# Patient Record
Sex: FEMALE | Race: WHITE | NOT HISPANIC OR LATINO | URBAN - METROPOLITAN AREA
[De-identification: names, ages, dates, MRNs, and addresses within clinical notes are randomized per-mention and may not be internally consistent; named-entity substitution may affect disease eponyms.]

---

## 2017-01-06 ENCOUNTER — IMPORTED ENCOUNTER (OUTPATIENT)
Dept: URBAN - METROPOLITAN AREA CLINIC 1 | Facility: CLINIC | Age: 62
End: 2017-01-06

## 2017-01-06 PROBLEM — H04.123: Noted: 2017-01-06

## 2017-01-06 PROBLEM — H16.143: Noted: 2017-01-06

## 2017-01-06 PROBLEM — H01.004: Noted: 2017-01-06

## 2017-01-06 PROBLEM — H01.001: Noted: 2017-01-06

## 2017-01-06 PROBLEM — H43.811: Noted: 2017-01-06

## 2017-01-06 PROBLEM — H25.813: Noted: 2017-01-06

## 2017-01-06 PROBLEM — H10.45: Noted: 2017-01-06

## 2017-01-06 PROBLEM — H40.013: Noted: 2017-01-06

## 2017-01-06 PROCEDURE — 99213 OFFICE O/P EST LOW 20 MIN: CPT

## 2017-01-06 NOTE — PATIENT DISCUSSION
1.  Cataract OU: Observe for now without intervention due to uncorrected vision 20/20 OU. PT does glare down to 20/50 OU but would like to wait until vision worsens. The patient was advised to contact us if any change or worsening of vision. 2. Dry Eyes OU w/ resolved PEK OU (H/o Large Parasol Plugs LLs OU) Continue ATs QID OU. 3.  Blepharitis posterior type OU - Daily warm compresses and lid scrubs were recommended. 4. Glaucoma Suspect OU (0.75/0.5) - Past w/u -. Fm hx. IOP stable on no meds. Will continue to monitor. 5. PVD w/o Tear OD - old/stable. 6. Allergic Conjunctivitis OU - Restart Zaditor BID OU and warm compresses BID OU. Return for an appointment for Return as scheduled with Dr. Kandi Bailon.

## 2017-12-18 ENCOUNTER — IMPORTED ENCOUNTER (OUTPATIENT)
Dept: URBAN - METROPOLITAN AREA CLINIC 1 | Facility: CLINIC | Age: 62
End: 2017-12-18

## 2017-12-18 PROBLEM — H04.123: Noted: 2017-12-18

## 2017-12-18 PROBLEM — H01.002: Noted: 2017-12-18

## 2017-12-18 PROBLEM — H40.023: Noted: 2017-12-18

## 2017-12-18 PROBLEM — H10.45: Noted: 2017-12-18

## 2017-12-18 PROBLEM — H25.813: Noted: 2017-12-18

## 2017-12-18 PROBLEM — H43.811: Noted: 2017-12-18

## 2017-12-18 PROBLEM — H16.143: Noted: 2017-12-18

## 2017-12-18 PROBLEM — H01.005: Noted: 2017-12-18

## 2017-12-18 PROCEDURE — 92014 COMPRE OPH EXAM EST PT 1/>: CPT

## 2017-12-18 PROCEDURE — 92133 CPTRZD OPH DX IMG PST SGM ON: CPT

## 2018-05-18 NOTE — PATIENT DISCUSSION
target brand solution.  change lenses monthly. pt had trouble with new trials at CPU distance (unsure if helped night glare). So try back to old Rx and see if is satisfactory.

## 2019-02-01 ENCOUNTER — IMPORTED ENCOUNTER (OUTPATIENT)
Dept: URBAN - METROPOLITAN AREA CLINIC 1 | Facility: CLINIC | Age: 64
End: 2019-02-01

## 2019-02-01 PROBLEM — H10.45: Noted: 2019-02-01

## 2019-02-01 PROBLEM — H01.001: Noted: 2019-02-01

## 2019-02-01 PROBLEM — H01.005: Noted: 2019-02-01

## 2019-02-01 PROBLEM — H01.004: Noted: 2019-02-01

## 2019-02-01 PROBLEM — H01.002: Noted: 2019-02-01

## 2019-02-01 PROBLEM — H04.123: Noted: 2019-02-01

## 2019-02-01 PROBLEM — H43.811: Noted: 2019-02-01

## 2019-02-01 PROBLEM — H40.023: Noted: 2019-02-01

## 2019-02-01 PROBLEM — H16.143: Noted: 2019-02-01

## 2019-02-01 PROCEDURE — 92133 CPTRZD OPH DX IMG PST SGM ON: CPT

## 2019-02-01 PROCEDURE — 92014 COMPRE OPH EXAM EST PT 1/>: CPT

## 2019-02-01 NOTE — PATIENT DISCUSSION
1.  Glaucoma Suspect OU (CD: 0.75 / 0.55) -- IOP stable today OU. Positive Family h/o Glaucoma. Asymmetric Cupping OD > OS. OCT today shows WNL OU. Patient is considered high risk. Condition was discussed with patient and patient understands. Will continue to monitor patient for any progression in condition. Patient was advised to call us with any problems questions or concerns. 2.  KENZIE w/ PEK OU (Plugs in Place LL's OU) -- Progressed. Recommend increase the frequent use of OTC PF AT's Q2H OU Routinely (sample given). 3.  Anterior Blepharitis OU - Daily Hot compresses and lid scrubs were recommended. 4. Allergic Conjunctivitis OU -- The condition was discussed with the patient. Avoidance of allergens and cool compresses were recommended. Continue Zaditor OU BID PRN Itching. 5.  PVD w/o Tear OD -- Old Stable. RD precautions given. Patient defers the refraction at today's visit. Return for an appointment in 6 MO for a 10 OU (Dry Eye / K Check OU) with Dr. Claudeen Cobble.

## 2019-03-25 ENCOUNTER — IMPORTED ENCOUNTER (OUTPATIENT)
Dept: URBAN - METROPOLITAN AREA CLINIC 1 | Facility: CLINIC | Age: 64
End: 2019-03-25

## 2019-03-25 PROBLEM — H04.123: Noted: 2019-03-25

## 2019-03-25 PROBLEM — H16.143: Noted: 2019-03-25

## 2019-03-25 PROCEDURE — 92012 INTRM OPH EXAM EST PATIENT: CPT

## 2019-03-25 NOTE — PATIENT DISCUSSION
1.  KENZIE w/ PEK OU- (LL plugs in place OU) Recommend PF ATs Q3H OU routinely 2. Glaucoma Suspect OU (CD 0.75/0.55) - IOP stable. Family hx. 3.  Anterior Blepharitis OU - Daily Hot compresses and lid scrubs were recommended. 4. Allergic Conjunctivitis OU - The condition was discussed with the patient. Avoidance of allergens and cool compresses were recommended. Continue Zaditor OU BID PRN Itching. 5.  Cataract OU - Observe 6. H/o PVD ODReturn for an appointment for Return as scheduled 10 with Dr. Ninoska Marie.

## 2020-04-28 ENCOUNTER — IMPORTED ENCOUNTER (OUTPATIENT)
Dept: URBAN - METROPOLITAN AREA CLINIC 1 | Facility: CLINIC | Age: 65
End: 2020-04-28

## 2020-04-28 PROBLEM — H43.811: Noted: 2020-04-28

## 2020-04-28 PROBLEM — H25.812: Noted: 2020-04-28

## 2020-04-28 PROBLEM — H16.143: Noted: 2020-04-28

## 2020-04-28 PROBLEM — H40.013: Noted: 2020-04-28

## 2020-04-28 PROBLEM — H01.004: Noted: 2020-04-28

## 2020-04-28 PROBLEM — H25.813: Noted: 2020-04-28

## 2020-04-28 PROBLEM — H01.001: Noted: 2020-04-28

## 2020-04-28 PROBLEM — H04.123: Noted: 2020-04-28

## 2020-04-28 PROCEDURE — 92014 COMPRE OPH EXAM EST PT 1/>: CPT

## 2020-04-28 NOTE — PATIENT DISCUSSION
1.  Cataract OU --  Visually Significant OU however will hold on Phaco/PCL until PEK improved. 2. KENZIE w/ PEK OU -- (LL plugs in place OU) Non-compliance with ATs. Recommend ATs TID OU routinely. Add Restasis w/o improvement. 3.  Anterior Blepharitis OU -- Daily Hot compresses and lid scrubs were recommended. 4. Glaucoma Suspect OU (CD 0.75/0.55) -- IOP stable. Family hx. Past w/u negative. Cont to observe. 5. Allergic Conjunctivitis OU -- The condition was discussed with the patient. Avoidance of allergens and cool compresses were recommended. Continue Zaditor OU BID PRN Itching. 6.  PVD w/o Tear OD -- RD precautions. Return for an appointment in 4 months for a 10/dfe/glare *Check K's* with Dr. Angela Easley.

## 2021-02-12 ENCOUNTER — IMPORTED ENCOUNTER (OUTPATIENT)
Dept: URBAN - METROPOLITAN AREA CLINIC 1 | Facility: CLINIC | Age: 66
End: 2021-02-12

## 2021-02-12 PROBLEM — H04.123: Noted: 2021-02-12

## 2021-02-12 PROBLEM — H01.001: Noted: 2021-02-12

## 2021-02-12 PROBLEM — H01.004: Noted: 2021-02-12

## 2021-02-12 PROBLEM — H25.813: Noted: 2021-02-12

## 2021-02-12 PROBLEM — H16.143: Noted: 2021-02-12

## 2021-02-12 PROCEDURE — 99214 OFFICE O/P EST MOD 30 MIN: CPT

## 2021-02-12 NOTE — PATIENT DISCUSSION
1.  KENZIE w/ PEK OU- (LL plugs in place OU) Improved OU. Recommend PF ATs Q3H OU routinely. 2.  Cataract OU: Observe for now without intervention. The patient was advised to contact us if any change or worsening of vision3. Anterior Blepharitis OU - Daily Hot compresses and lid scrubs were recommended. 4. Glaucoma Suspect OU (CD 0.75/0.55) - IOP stable. Family hx. Past w/u negative. Cont to observe. 5. Allergic Conjunctivitis OU - The condition was discussed with the patient. Avoidance of allergens and cool compresses were recommended. Continue Zaditor OU BID PRN Itching. 6.  PVD w/o Tear OD - RD precautions. Patient deferred Manifest Rx today. Return for an appointment in 1 year 30/glare with Dr. Angela Easley.

## 2021-02-12 NOTE — PATIENT DISCUSSION
Glaucoma Suspect OU (CD 0.75/0.55) - IOP stable. Family hx. Past w/u negative. Cont to observe. 5. Allergic Conjunctivitis OU - The condition was discussed with the patient. Avoidance of allergens and cool compresses were recommended. Continue Zaditor OU BID PRN Itching. 6.  PVD w/o Tear OD - RD precautions. Patient deferred Manifest Rx today. Return for an appointment in 1 year 30/glare with Dr. Luis Urbano.

## 2021-10-15 ENCOUNTER — IMPORTED ENCOUNTER (OUTPATIENT)
Dept: URBAN - METROPOLITAN AREA CLINIC 1 | Facility: CLINIC | Age: 66
End: 2021-10-15

## 2021-10-15 PROBLEM — H01.001: Noted: 2021-10-15

## 2021-10-15 PROBLEM — B88.0: Noted: 2021-10-15

## 2021-10-15 PROBLEM — H04.123: Noted: 2021-10-15

## 2021-10-15 PROBLEM — H01.024: Noted: 2021-10-15

## 2021-10-15 PROBLEM — H01.021: Noted: 2021-10-15

## 2021-10-15 PROBLEM — H25.13: Noted: 2021-10-15

## 2021-10-15 PROBLEM — H01.004: Noted: 2021-10-15

## 2021-10-15 PROCEDURE — 99213 OFFICE O/P EST LOW 20 MIN: CPT

## 2021-10-15 NOTE — PATIENT DISCUSSION
1.  Posterior Blepharitis OU - Daily hot compresses and lid scrubs were recommended. Start Azasite QHS OU x 10 days. 2. 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 Art Signs Oust-Demodex OTC to clean eyelids or Avenova. Cont ivermectin hollie on face prescribed by dermatologist.3. Dry Eyes OU - The use/continuation of artificial tears were recommended. 4.  Cataract OU - Observe for now without intervention. The patient was advised to contact us if any change or worsening of vision. 5. H/o Glaucoma Suspect OUReturn for an appointment in 1 month for 10 with Dr. Zulma Guerrero.

## 2022-03-22 ENCOUNTER — COMPREHENSIVE EXAM (OUTPATIENT)
Dept: URBAN - METROPOLITAN AREA CLINIC 1 | Facility: CLINIC | Age: 67
End: 2022-03-22

## 2022-03-22 DIAGNOSIS — H16.143: ICD-10-CM

## 2022-03-22 DIAGNOSIS — H01.021: ICD-10-CM

## 2022-03-22 DIAGNOSIS — H01.024: ICD-10-CM

## 2022-03-22 DIAGNOSIS — H04.123: ICD-10-CM

## 2022-03-22 DIAGNOSIS — H25.813: ICD-10-CM

## 2022-03-22 DIAGNOSIS — H40.033: ICD-10-CM

## 2022-03-22 PROCEDURE — 92014 COMPRE OPH EXAM EST PT 1/>: CPT

## 2022-03-22 PROCEDURE — 92015 DETERMINE REFRACTIVE STATE: CPT

## 2022-03-22 ASSESSMENT — VISUAL ACUITY
OS_CC: 20/20
OD_BAT: 20/100
OD_CC: 20/20
OS_BAT: 20/100

## 2022-03-22 ASSESSMENT — TONOMETRY
OD_IOP_MMHG: 15
OS_IOP_MMHG: 15

## 2022-03-22 NOTE — PATIENT DISCUSSION
(Surgical) Visually Significant (secondary to glare), discussed the risks, benefits, alternatives, and limitations of cataract surgery. The patient stated a full understanding and a desire to proceed with the procedure. The patient will need to return for pre-op appointment with cataract measurements and to have any additional questions answered, and start pre-operative eye drops as directed. Phaco OS then OD.

## 2022-03-22 NOTE — PATIENT DISCUSSION
(CD 0.80/0.50) Past w/u negative. IOP stable. Negative family hx. Patient is considered low risk. Condition was discussed with patient and patient understands. Will continue to monitor patient for any progression in condition. Patient was advised to call us with any problems, questions, or concerns.

## 2022-03-29 ENCOUNTER — PRE-OP/H&P (OUTPATIENT)
Dept: URBAN - METROPOLITAN AREA CLINIC 1 | Facility: CLINIC | Age: 67
End: 2022-03-29

## 2022-03-29 VITALS
DIASTOLIC BLOOD PRESSURE: 82 MMHG | SYSTOLIC BLOOD PRESSURE: 116 MMHG | HEART RATE: 84 BPM | WEIGHT: 167 LBS | HEIGHT: 65 IN | BODY MASS INDEX: 27.82 KG/M2

## 2022-03-29 DIAGNOSIS — H25.812: ICD-10-CM

## 2022-03-29 PROCEDURE — 92012 INTRM OPH EXAM EST PATIENT: CPT

## 2022-03-29 PROCEDURE — 92136 OPHTHALMIC BIOMETRY: CPT

## 2022-03-29 PROCEDURE — 92025 CPTRIZED CORNEAL TOPOGRAPHY: CPT

## 2022-03-29 ASSESSMENT — VISUAL ACUITY
OD_BAT: 20/100
OD_SC: 20/30
OS_BAT: 20/100

## 2022-03-29 NOTE — PATIENT DISCUSSION
Visually Significant secondary to glare, discussed the risks, benefits, alternatives, and limitations of cataract surgery. The patient stated a full understanding and a desire to proceed with the procedure. Discussed with patient if post-op drops are more than $120 for all three combined when filling at their Pharmacy, please call our office to request generic substitutions. Patient came into pre-op appointment alone and lifestyle questioner completed. Patient understands she will need specs post-op to achieve best corrected vision. **Pt to use Zylet TID OS per PMG given Aspirin allergies.

## 2022-04-02 ASSESSMENT — VISUAL ACUITY
OD_CC: 20/20-1
OD_CC: 20/25
OS_GLARE: 20/100
OD_GLARE: 20/60
OD_CC: 20/20-2
OD_GLARE: 20/100
OS_GLARE: 20/50
OD_GLARE: 20/50
OS_CC: 20/25
OS_CC: 20/20
OS_CC: 20/20-2
OS_CC: 20/25+2
OS_CC: J1
OS_CC: 20/25
OS_CC: 20/20
OS_GLARE: 20/60
OD_CC: 20/25
OD_CC: 20/20-2
OD_CC: 20/25
OD_CC: J1

## 2022-04-02 ASSESSMENT — TONOMETRY
OD_IOP_MMHG: 17
OD_IOP_MMHG: 16
OD_IOP_MMHG: 19
OD_IOP_MMHG: 17
OS_IOP_MMHG: 19
OS_IOP_MMHG: 16
OS_IOP_MMHG: 17
OS_IOP_MMHG: 16
OD_IOP_MMHG: 16
OD_IOP_MMHG: 16

## 2022-04-06 ENCOUNTER — SURGERY/PROCEDURE (OUTPATIENT)
Dept: URBAN - METROPOLITAN AREA SURGERY 1 | Facility: SURGERY | Age: 67
End: 2022-04-06

## 2022-04-06 DIAGNOSIS — H25.813: ICD-10-CM

## 2022-04-06 PROBLEM — H01.021: Noted: 2021-10-15

## 2022-04-06 PROBLEM — H01.024: Noted: 2021-10-15

## 2022-04-06 PROBLEM — Z96.1: Noted: 2022-04-06

## 2022-04-06 PROBLEM — H04.123: Noted: 2021-10-15

## 2022-04-06 PROCEDURE — 66984 XCAPSL CTRC RMVL W/O ECP: CPT

## 2022-04-07 ENCOUNTER — POST-OP (OUTPATIENT)
Dept: URBAN - METROPOLITAN AREA CLINIC 1 | Facility: CLINIC | Age: 67
End: 2022-04-07

## 2022-04-07 DIAGNOSIS — Z96.1: ICD-10-CM

## 2022-04-07 PROCEDURE — 99024 POSTOP FOLLOW-UP VISIT: CPT

## 2022-04-07 ASSESSMENT — TONOMETRY: OS_IOP_MMHG: 19

## 2022-04-07 ASSESSMENT — VISUAL ACUITY: OS_SC: 20/25

## 2022-04-11 ENCOUNTER — POST OP/EVAL OF SECOND EYE (OUTPATIENT)
Dept: URBAN - METROPOLITAN AREA CLINIC 1 | Facility: CLINIC | Age: 67
End: 2022-04-11

## 2022-04-11 VITALS
HEART RATE: 84 BPM | WEIGHT: 162 LBS | SYSTOLIC BLOOD PRESSURE: 92 MMHG | DIASTOLIC BLOOD PRESSURE: 73 MMHG | BODY MASS INDEX: 27.66 KG/M2 | HEIGHT: 64 IN

## 2022-04-11 DIAGNOSIS — H25.811: ICD-10-CM

## 2022-04-11 PROCEDURE — 92136 OPHTHALMIC BIOMETRY: CPT

## 2022-04-11 RX ORDER — BROMFENAC SODIUM 0.7 MG/ML: 1 SOLUTION/ DROPS OPHTHALMIC ONCE A DAY

## 2022-04-11 ASSESSMENT — TONOMETRY
OD_IOP_MMHG: 15
OS_IOP_MMHG: 15

## 2022-04-11 ASSESSMENT — VISUAL ACUITY
OD_SC: 20/30 -
OS_SC: 20/20

## 2022-04-11 ASSESSMENT — KERATOMETRY
OS_K1POWER_DIOPTERS: 40.75
OD_AXISANGLE2_DEGREES: 048
OS_AXISANGLE_DEGREES: 079
OS_K2POWER_DIOPTERS: 42.25
OD_K2POWER_DIOPTERS: 43.00
OD_K1POWER_DIOPTERS: 42.75
OD_AXISANGLE_DEGREES: 138
OS_AXISANGLE2_DEGREES: 169

## 2022-04-11 NOTE — PATIENT DISCUSSION
LL plug removed OS today due to irritiation. Can consider reinserting after phaco. Recommend ATs TID OU, routinely.

## 2022-04-11 NOTE — PATIENT DISCUSSION
Visually Significant (Secondary to glare), discussed the risks, benefits, alternatives, and limitations of cataract surgery. The patient stated a full understanding and a desire to proceed with the procedure. Discussed with patient if post-op drops are more than $120 for all three combined when filling at their Pharmacy, please call our office to request generic substitutions. Phaco PCL OD.

## 2022-04-14 ENCOUNTER — EMERGENCY VISIT (OUTPATIENT)
Dept: URBAN - METROPOLITAN AREA CLINIC 1 | Facility: CLINIC | Age: 67
End: 2022-04-14

## 2022-04-14 DIAGNOSIS — Z96.1: ICD-10-CM

## 2022-04-14 PROCEDURE — 99024 POSTOP FOLLOW-UP VISIT: CPT

## 2022-04-14 ASSESSMENT — KERATOMETRY
OS_K1POWER_DIOPTERS: 40.75
OD_AXISANGLE2_DEGREES: 048
OD_AXISANGLE_DEGREES: 138
OD_K1POWER_DIOPTERS: 42.75
OS_AXISANGLE2_DEGREES: 169
OS_K2POWER_DIOPTERS: 42.25
OS_AXISANGLE_DEGREES: 079
OD_K2POWER_DIOPTERS: 43.00

## 2022-04-14 ASSESSMENT — VISUAL ACUITY
OD_SC: 20/25-
OS_SC: 20/20

## 2022-04-14 ASSESSMENT — TONOMETRY
OS_IOP_MMHG: 16
OD_IOP_MMHG: 15

## 2022-04-27 ENCOUNTER — SURGERY/PROCEDURE (OUTPATIENT)
Dept: URBAN - METROPOLITAN AREA SURGERY 1 | Facility: SURGERY | Age: 67
End: 2022-04-27

## 2022-04-27 DIAGNOSIS — H25.811: ICD-10-CM

## 2022-04-27 PROCEDURE — 66984 XCAPSL CTRC RMVL W/O ECP: CPT

## 2022-04-28 ENCOUNTER — POST-OP (OUTPATIENT)
Dept: URBAN - METROPOLITAN AREA CLINIC 1 | Facility: CLINIC | Age: 67
End: 2022-04-28

## 2022-04-28 DIAGNOSIS — Z96.1: ICD-10-CM

## 2022-04-28 PROCEDURE — 99024 POSTOP FOLLOW-UP VISIT: CPT

## 2022-04-28 ASSESSMENT — TONOMETRY
OS_IOP_MMHG: 16
OD_IOP_MMHG: 16

## 2022-04-28 ASSESSMENT — KERATOMETRY
OD_K2POWER_DIOPTERS: 43.00
OD_AXISANGLE2_DEGREES: 048
OS_AXISANGLE2_DEGREES: 169
OD_K1POWER_DIOPTERS: 42.75
OS_K1POWER_DIOPTERS: 40.75
OS_AXISANGLE_DEGREES: 079
OS_K2POWER_DIOPTERS: 42.25
OD_AXISANGLE_DEGREES: 138

## 2022-04-28 ASSESSMENT — VISUAL ACUITY
OS_SC: 20/20
OD_SC: 20/25

## 2022-04-28 NOTE — PATIENT DISCUSSION
Use Zylet BID OD and Prolensa QD OD: Use all gtts through completion of PO gtt chart regimen/ Per our instructions given to patient.

## 2022-05-05 ASSESSMENT — KERATOMETRY
OS_K2POWER_DIOPTERS: 42.25
OD_K1POWER_DIOPTERS: 42.75
OD_AXISANGLE_DEGREES: 138
OS_AXISANGLE2_DEGREES: 169
OD_K2POWER_DIOPTERS: 43.00
OS_K1POWER_DIOPTERS: 40.75
OS_AXISANGLE_DEGREES: 079
OD_AXISANGLE2_DEGREES: 048

## 2022-05-10 ENCOUNTER — EMERGENCY VISIT (OUTPATIENT)
Dept: URBAN - METROPOLITAN AREA CLINIC 1 | Facility: CLINIC | Age: 67
End: 2022-05-10

## 2022-05-10 DIAGNOSIS — H04.123: ICD-10-CM

## 2022-05-10 PROCEDURE — 99213 OFFICE O/P EST LOW 20 MIN: CPT

## 2022-05-10 ASSESSMENT — VISUAL ACUITY
OS_SC: J2
OS_SC: 20/20-2
OD_SC: J2
OD_SC: 20/20-1

## 2022-05-10 ASSESSMENT — TONOMETRY
OS_IOP_MMHG: 16
OD_IOP_MMHG: 15

## 2022-05-10 NOTE — PATIENT DISCUSSION
LL plug removed OD secondary to irritation. Can consider reinserting after KR appt. Recommend ATs TID OU, routinely.

## 2022-05-23 ENCOUNTER — POST-OP (OUTPATIENT)
Dept: URBAN - METROPOLITAN AREA CLINIC 1 | Facility: CLINIC | Age: 67
End: 2022-05-23

## 2022-05-23 DIAGNOSIS — Z96.1: ICD-10-CM

## 2022-05-23 PROCEDURE — 99024 POSTOP FOLLOW-UP VISIT: CPT

## 2022-05-26 ASSESSMENT — TONOMETRY
OS_IOP_MMHG: 18
OD_IOP_MMHG: 16

## 2022-05-26 ASSESSMENT — VISUAL ACUITY
OD_CC: J2
OD_SC: 20/25
OS_SC: 20/20
OS_CC: J3

## 2022-07-19 ENCOUNTER — EMERGENCY VISIT (OUTPATIENT)
Dept: URBAN - METROPOLITAN AREA CLINIC 1 | Facility: CLINIC | Age: 67
End: 2022-07-19

## 2022-07-19 DIAGNOSIS — H04.123: ICD-10-CM

## 2022-07-19 DIAGNOSIS — H26.493: ICD-10-CM

## 2022-07-19 PROCEDURE — 99213 OFFICE O/P EST LOW 20 MIN: CPT

## 2022-07-19 ASSESSMENT — TONOMETRY
OD_IOP_MMHG: 13
OS_IOP_MMHG: 13

## 2022-07-19 ASSESSMENT — VISUAL ACUITY
OS_BAT: 20/50
OD_BAT: 20/30
OD_SC: 20/20
OS_SC: 20/30

## 2022-07-19 NOTE — PATIENT DISCUSSION
Advised the patient to continue the use of PF ATs QID OU, routinely. Will have the patient begin the use of Cequa BID OU (Erx'd) today.

## 2022-10-18 ENCOUNTER — FOLLOW UP (OUTPATIENT)
Dept: URBAN - METROPOLITAN AREA CLINIC 1 | Facility: CLINIC | Age: 67
End: 2022-10-18

## 2022-10-18 DIAGNOSIS — H04.123: ICD-10-CM

## 2022-10-18 DIAGNOSIS — H26.493: ICD-10-CM

## 2022-10-18 PROCEDURE — 92015 DETERMINE REFRACTIVE STATE: CPT

## 2022-10-18 PROCEDURE — 99213 OFFICE O/P EST LOW 20 MIN: CPT

## 2022-10-18 ASSESSMENT — VISUAL ACUITY
OS_SC: 20/20
OD_BAT: 20/60
OU_OTHER: OTC +2.00
OU_CC: J1+
OD_SC: 20/20
OS_BAT: 20/40

## 2022-10-18 ASSESSMENT — TONOMETRY
OD_IOP_MMHG: 15
OS_IOP_MMHG: 15

## 2022-11-02 NOTE — PATIENT DISCUSSION
1.  Glaucoma Suspect OU (0.75/0.6/ () FHX): Stable IOP OU. Normal OCT OU. Asymmetric cupping. Patient is considered high risk. Condition was discussed with patient and patient understands. Will continue to monitor patient for any progression in condition. Patient was advised to call us with any problems questions or concerns. 2.  Cataract OU: Observe for now without intervention. The patient was advised to contact us if any change or worsening of vision3. KENZIE w/ PEK OU- Progressed OU. Plugs intact LL's OU. The increase of artificial tears OU to TID were recommended. 4.  Allergic Conjunctivitis OU- Continue Zaditor OU BID. The condition was  discussed with the patient. Avoidance of allergens and cool compresses were recommended. 5. Blepharitis anterior type OU- Stable. Continu daily warm compresses and lid scrubs were recommended. 6. PVD w/o Tear OD- Old stable. 7.  Return for an appointment for a 30/OCT in 1 year with Dr. Kathy Manzanares. hg 6.9 , started blood transfusion at 0020 , negative of any blood transfusion reaction noted , denies any pain , no SOB , afebrile

## 2022-11-18 ENCOUNTER — CLINIC PROCEDURE ONLY (OUTPATIENT)
Dept: URBAN - METROPOLITAN AREA CLINIC 1 | Facility: CLINIC | Age: 67
End: 2022-11-18

## 2022-11-18 DIAGNOSIS — Z96.1: ICD-10-CM

## 2022-11-18 DIAGNOSIS — H26.491: ICD-10-CM

## 2022-11-18 PROCEDURE — 66821 AFTER CATARACT LASER SURGERY: CPT

## 2022-11-18 NOTE — PROCEDURE NOTE: CLINICAL
PROCEDURE NOTE: YAG Capsulotomy OD. Diagnosis: Posterior Capsular Opacity. Anesthesia: Topical. The purpose and nature of the procedure, possible alternative methods of treatment, the risks involved and the possibility of complications were discussed with patient. The Patient wishes to proceed and the consent was signed. 1 gtt Prolensa applied. The laser was then performed under topical anesthesia with no complications. Post op instructions were given to patient as well as a follow-up appointment. Patient was advised to call our office if any questions or concerns. Jagdish Olson

## 2022-12-02 ENCOUNTER — CLINIC PROCEDURE ONLY (OUTPATIENT)
Dept: URBAN - METROPOLITAN AREA CLINIC 1 | Facility: CLINIC | Age: 67
End: 2022-12-02

## 2022-12-02 PROCEDURE — 66821 AFTER CATARACT LASER SURGERY: CPT

## 2022-12-02 NOTE — PROCEDURE NOTE: CLINICAL
PROCEDURE NOTE: YAG Capsulotomy OS. Anesthesia: Topical. The purpose and nature of the procedure, possible alternative methods of treatment, the risks involved and the possibility of complications were discussed with patient. The Patient wishes to proceed and the consent was signed. 1 gtt Prolensa applied. The laser was then performed under topical anesthesia with no complications. Post op instructions were given to patient as well as a follow-up appointment. Patient was advised to call our office if any questions or concerns. Yuliya Sepulveda

## 2023-09-11 ENCOUNTER — COMPREHENSIVE EXAM (OUTPATIENT)
Dept: URBAN - METROPOLITAN AREA CLINIC 1 | Facility: CLINIC | Age: 68
End: 2023-09-11

## 2023-09-11 DIAGNOSIS — H40.013: ICD-10-CM

## 2023-09-11 DIAGNOSIS — Z96.1: ICD-10-CM

## 2023-09-11 DIAGNOSIS — H01.021: ICD-10-CM

## 2023-09-11 DIAGNOSIS — H10.45: ICD-10-CM

## 2023-09-11 DIAGNOSIS — H16.143: ICD-10-CM

## 2023-09-11 DIAGNOSIS — H01.024: ICD-10-CM

## 2023-09-11 DIAGNOSIS — H04.123: ICD-10-CM

## 2023-09-11 PROCEDURE — 92133 CPTRZD OPH DX IMG PST SGM ON: CPT

## 2023-09-11 PROCEDURE — 99214 OFFICE O/P EST MOD 30 MIN: CPT

## 2023-09-11 PROCEDURE — 92015 DETERMINE REFRACTIVE STATE: CPT

## 2023-09-11 ASSESSMENT — VISUAL ACUITY
OS_CC: J1+
OD_CC: 20/20
OD_CC: J1+
OS_CC: 20/20

## 2023-09-11 ASSESSMENT — TONOMETRY
OD_IOP_MMHG: 16
OS_IOP_MMHG: 16

## 2024-01-15 NOTE — PATIENT DISCUSSION
Use all gtts through completion of PO gtt chart regimen/ Per our instructions given to patient. [Patient] : patient [Spouse] : spouse

## 2024-01-25 ENCOUNTER — FOLLOW UP (OUTPATIENT)
Dept: URBAN - METROPOLITAN AREA CLINIC 1 | Facility: CLINIC | Age: 69
End: 2024-01-25

## 2024-01-25 DIAGNOSIS — H04.123: ICD-10-CM

## 2024-01-25 DIAGNOSIS — H16.143: ICD-10-CM

## 2024-01-25 PROCEDURE — 99213 OFFICE O/P EST LOW 20 MIN: CPT

## 2024-01-25 ASSESSMENT — TONOMETRY
OD_IOP_MMHG: 16
OS_IOP_MMHG: 16

## 2024-01-25 ASSESSMENT — VISUAL ACUITY
OD_CC: J1+
OS_CC: 20/20
OD_CC: 20/20
OS_CC: J1+

## 2024-04-09 ENCOUNTER — FOLLOW UP (OUTPATIENT)
Dept: URBAN - METROPOLITAN AREA CLINIC 1 | Facility: CLINIC | Age: 69
End: 2024-04-09

## 2024-04-09 DIAGNOSIS — H04.123: ICD-10-CM

## 2024-04-09 DIAGNOSIS — H16.143: ICD-10-CM

## 2024-04-09 PROCEDURE — 99213 OFFICE O/P EST LOW 20 MIN: CPT

## 2024-04-09 ASSESSMENT — TONOMETRY
OD_IOP_MMHG: 16
OS_IOP_MMHG: 16

## 2024-04-09 ASSESSMENT — VISUAL ACUITY
OD_CC: 20/20
OS_CC: 20/20

## 2024-07-11 ENCOUNTER — EMERGENCY VISIT (OUTPATIENT)
Dept: URBAN - METROPOLITAN AREA CLINIC 1 | Facility: CLINIC | Age: 69
End: 2024-07-11

## 2024-07-11 DIAGNOSIS — H00.14: ICD-10-CM

## 2024-07-11 PROCEDURE — 99213 OFFICE O/P EST LOW 20 MIN: CPT

## 2024-07-11 ASSESSMENT — VISUAL ACUITY
OD_CC: J1
OS_SC: 20/25-1
OD_SC: 20/20
OS_CC: J1

## 2024-07-11 ASSESSMENT — TONOMETRY
OD_IOP_MMHG: 14
OS_IOP_MMHG: 14

## 2024-07-18 ENCOUNTER — FOLLOW UP (OUTPATIENT)
Dept: URBAN - METROPOLITAN AREA CLINIC 1 | Facility: CLINIC | Age: 69
End: 2024-07-18

## 2024-07-18 DIAGNOSIS — H00.14: ICD-10-CM

## 2024-07-18 PROCEDURE — 99213 OFFICE O/P EST LOW 20 MIN: CPT

## 2024-07-18 ASSESSMENT — TONOMETRY
OS_IOP_MMHG: 12
OD_IOP_MMHG: 12

## 2024-07-18 ASSESSMENT — VISUAL ACUITY
OS_CC: 20/20
OD_CC: 20/20
OU_CC: 20/20

## 2024-08-20 ENCOUNTER — COMPREHENSIVE EXAM (OUTPATIENT)
Dept: URBAN - METROPOLITAN AREA CLINIC 1 | Facility: CLINIC | Age: 69
End: 2024-08-20

## 2024-08-20 DIAGNOSIS — H01.024: ICD-10-CM

## 2024-08-20 DIAGNOSIS — Z96.1: ICD-10-CM

## 2024-08-20 DIAGNOSIS — H16.143: ICD-10-CM

## 2024-08-20 DIAGNOSIS — H40.013: ICD-10-CM

## 2024-08-20 DIAGNOSIS — H04.123: ICD-10-CM

## 2024-08-20 DIAGNOSIS — H01.021: ICD-10-CM

## 2024-08-20 DIAGNOSIS — H00.14: ICD-10-CM

## 2024-08-20 DIAGNOSIS — H10.45: ICD-10-CM

## 2024-08-20 PROCEDURE — 92083 EXTENDED VISUAL FIELD XM: CPT

## 2024-08-20 PROCEDURE — 99214 OFFICE O/P EST MOD 30 MIN: CPT

## 2024-08-20 ASSESSMENT — TONOMETRY
OS_IOP_MMHG: 10
OD_IOP_MMHG: 10

## 2024-08-20 ASSESSMENT — VISUAL ACUITY
OU_CC: 20/20
OS_CC: 20/20
OD_CC: 20/20

## 2025-02-13 ENCOUNTER — FOLLOW UP (OUTPATIENT)
Age: 70
End: 2025-02-13

## 2025-02-13 DIAGNOSIS — H04.123: ICD-10-CM

## 2025-02-13 DIAGNOSIS — H16.143: ICD-10-CM

## 2025-02-13 PROCEDURE — 99213 OFFICE O/P EST LOW 20 MIN: CPT
